# Patient Record
Sex: MALE | Race: OTHER | NOT HISPANIC OR LATINO | URBAN - METROPOLITAN AREA
[De-identification: names, ages, dates, MRNs, and addresses within clinical notes are randomized per-mention and may not be internally consistent; named-entity substitution may affect disease eponyms.]

---

## 2018-06-26 ENCOUNTER — EMERGENCY (EMERGENCY)
Facility: HOSPITAL | Age: 38
LOS: 1 days | Discharge: HOME | End: 2018-06-26
Attending: EMERGENCY MEDICINE
Payer: COMMERCIAL

## 2018-06-26 VITALS
OXYGEN SATURATION: 100 % | TEMPERATURE: 98 F | WEIGHT: 154.98 LBS | HEART RATE: 63 BPM | SYSTOLIC BLOOD PRESSURE: 118 MMHG | DIASTOLIC BLOOD PRESSURE: 85 MMHG | RESPIRATION RATE: 14 BRPM

## 2018-06-26 VITALS
RESPIRATION RATE: 16 BRPM | SYSTOLIC BLOOD PRESSURE: 120 MMHG | HEART RATE: 79 BPM | OXYGEN SATURATION: 100 % | DIASTOLIC BLOOD PRESSURE: 87 MMHG

## 2018-06-26 PROCEDURE — 99283 EMERGENCY DEPT VISIT LOW MDM: CPT | Mod: 25

## 2018-06-26 PROCEDURE — 99283 EMERGENCY DEPT VISIT LOW MDM: CPT

## 2018-06-26 PROCEDURE — 82962 GLUCOSE BLOOD TEST: CPT

## 2018-06-26 RX ORDER — IBUPROFEN 200 MG
600 TABLET ORAL ONCE
Qty: 0 | Refills: 0 | Status: COMPLETED | OUTPATIENT
Start: 2018-06-26 | End: 2018-06-26

## 2018-06-26 RX ADMIN — Medication 600 MILLIGRAM(S): at 08:38

## 2018-06-26 NOTE — ED PROVIDER NOTE - MEDICAL DECISION MAKING DETAILS
Mild headache, pt has not taken any medications. Will check fingerstick due to family hx of DM and pt having numbness and tingling in tips of fingers. Will treat headache with motrin and reassess. [place]: Mild headache, pt has not taken any medications. Will check fingerstick due to family hx of DM and pt having numbness and tingling in tips of fingers. Will treat headache with motrin/tylenol and reassess. Sx likely due to work related stress.  pt has had complex migraine in the past with normal head CT and MRI brain.

## 2018-06-26 NOTE — ED PROVIDER NOTE - CONSTITUTIONAL, MLM
normal... Well appearing, well nourished, awake, alert, oriented to person, place, time/situation and in no apparent distress anxious.

## 2018-06-26 NOTE — ED PROVIDER NOTE - NS ED ROS FT
CONSTITUTIONAL: No fevers, no chills  Eyes: no visual changes  Ears: no ear drainage, no ear pain  Nose: no nasal congestion  Mouth/Throat: no sore throat  Cardiovascular: No Chest pain  Respiratory: No SOB  Gastrointestinal: No n/v/d, no abd pain  Genitourinary: no dysuria, no hematuria  SKIN: no rashes.  NEURO: +headache  PSYCHIATRIC: no known mental health issues.

## 2018-06-26 NOTE — ED ADULT NURSE NOTE - OBJECTIVE STATEMENT
Pt is a 38 yo M who came to the ED amb c/o "pressure" in the back of his head since yesterday, weakness, "jittery" and hands shaking last night. 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 A/O x3, PERRL, + and + yu hand grasp and leg lift, no apparent neuro deficits. Denies dizziness/lightheadedness, no vision changes, no photophobia, neck pain, cp, no fevers/chills, no abd pan/n/v/d; color good, skin warm, dry, + pulses, no edema. See neuro sheet.

## 2018-06-26 NOTE — ED PROVIDER NOTE - OBJECTIVE STATEMENT
37 YOM no pmh p/w posterior head tightness and pressure since yesterday. Pt denies any fevers, chills, denies neck stiffness, pt did not take any medications. Pt has been under a lot of stress lately at work as he is getting federally audited. Pt denies vision changes, confusion, denies chest pain, denies vomiting, denies nausea, denies abdominal pain. Pt states he also feels numbness in the tips of his fingers b/l. No weakness in arms hands.

## 2018-06-26 NOTE — ED ADULT NURSE NOTE - CHPI ED SYMPTOMS NEG
no fever/no dizziness/no loss of consciousness/no blurred vision/no vomiting/no confusion/no nausea/no numbness/no change in level of consciousness